# Patient Record
Sex: FEMALE | URBAN - METROPOLITAN AREA
[De-identification: names, ages, dates, MRNs, and addresses within clinical notes are randomized per-mention and may not be internally consistent; named-entity substitution may affect disease eponyms.]

---

## 2022-02-24 ENCOUNTER — APPOINTMENT (RX ONLY)
Dept: URBAN - METROPOLITAN AREA CLINIC 4 | Facility: CLINIC | Age: 37
Setting detail: DERMATOLOGY
End: 2022-02-24

## 2022-02-24 DIAGNOSIS — L65.9 NONSCARRING HAIR LOSS, UNSPECIFIED: ICD-10-CM | Status: INADEQUATELY CONTROLLED

## 2022-02-24 PROCEDURE — ? PRESCRIPTION MEDICATION MANAGEMENT

## 2022-02-24 PROCEDURE — ? DIAGNOSIS COMMENT

## 2022-02-24 PROCEDURE — ? COUNSELING

## 2022-02-24 PROCEDURE — 99204 OFFICE O/P NEW MOD 45 MIN: CPT

## 2022-02-24 ASSESSMENT — LOCATION SIMPLE DESCRIPTION DERM: LOCATION SIMPLE: RIGHT SCALP

## 2022-02-24 ASSESSMENT — LOCATION DETAILED DESCRIPTION DERM: LOCATION DETAILED: RIGHT MEDIAL FRONTAL SCALP

## 2022-02-24 ASSESSMENT — LOCATION ZONE DERM: LOCATION ZONE: SCALP

## 2022-02-24 NOTE — PROCEDURE: DIAGNOSIS COMMENT
Comment: Reports Hair loss began with matting following Covid and miscarriage.\\nregrowth present and no signs of scarring alopecia present\\n\\nDiscussed options such as Viviscal, PRP injections, spironolactone \\n\\nAdvised to start with Rogaine 5% foam\\n\\nTo send over labs from PCP- drawn within last 2 weeks
Render Risk Assessment In Note?: no
Detail Level: Simple

## 2022-02-24 NOTE — HPI: HAIR LOSS (PATIENT REPORTED)
Where Is Your Hair Loss Located?: Scalp
Please Specify Dates Of Recent Illness, Surgery, Weighloss, Childbirth, Or Increased Stress:: Covid October 2021
List Any Supplements You Are Currently Using (Separate Each Name With A Comma):: Biotin plus keratin.
Additional Comments (Use Complete Sentences): Patient Had Covid and miscarriage in October. noticed first hair started matting, losing a lot when brushing. previously on anti depressants staring in October and Stopped the beginning of February.

## 2022-02-24 NOTE — PROCEDURE: PRESCRIPTION MEDICATION MANAGEMENT
Continue Regimen: Can continue using biotin if desired.
Initiate Treatment: Otc rogaine foam
Detail Level: Zone
Render In Strict Bullet Format?: No